# Patient Record
Sex: MALE | Race: WHITE | NOT HISPANIC OR LATINO | URBAN - METROPOLITAN AREA
[De-identification: names, ages, dates, MRNs, and addresses within clinical notes are randomized per-mention and may not be internally consistent; named-entity substitution may affect disease eponyms.]

---

## 2017-12-06 ENCOUNTER — IMPORTED ENCOUNTER (OUTPATIENT)
Dept: URBAN - METROPOLITAN AREA CLINIC 50 | Facility: CLINIC | Age: 77
End: 2017-12-06

## 2017-12-27 ENCOUNTER — IMPORTED ENCOUNTER (OUTPATIENT)
Dept: URBAN - METROPOLITAN AREA CLINIC 50 | Facility: CLINIC | Age: 77
End: 2017-12-27

## 2018-12-26 ENCOUNTER — IMPORTED ENCOUNTER (OUTPATIENT)
Dept: URBAN - METROPOLITAN AREA CLINIC 50 | Facility: CLINIC | Age: 78
End: 2018-12-26

## 2019-02-06 NOTE — PATIENT DISCUSSION
NO FAMILY HISTORY, DISCUSSED  VF 24-2. OCT RNFL.  OFFERED GTTS VS SLT, PT WISHES TO PROCEED WITH SLT OD

## 2020-02-25 NOTE — PATIENT DISCUSSION
POAG OD / GLAUCOMA SUSPECT, OS : S/P SLT OD INCREASED CUP/DISC RATIO. NO FAMILY HISTORY. VF CHANGES DO NOT CORRELATE WITH RNFL. REVIEWED WITH PT TODAY. GOAL IOP &lt;21.

## 2020-02-25 NOTE — PATIENT DISCUSSION
POAG, OD:  I have explained to the patient at length regarding the diagnosis of primary open angle glaucoma and its pathophysiology. I have discussed the various treatment options including medications and surgery. .  I emphasized to the patient the importance of compliance with treatment and follow-up appointments.

## 2021-02-24 NOTE — PATIENT DISCUSSION
POAG OD / GLAUCOMA SUSPECT, OS : S/P SLT OD INCREASED CUP/DISC RATIO. NO FAMILY HISTORY. REVIEWED VF AND RNFL WITH PT TODAY. IOP WNL. GOAL IOP &lt;21.

## 2021-04-18 ASSESSMENT — TONOMETRY
OD_IOP_MMHG: 18
OS_IOP_MMHG: 18
OD_IOP_MMHG: 18
OS_IOP_MMHG: 19

## 2021-04-18 ASSESSMENT — VISUAL ACUITY
OS_CC: J1
OD_PH: 20/30
OD_CC: J1+ -2
OD_OTHER: 20/70. 20/400.
OD_BAT: 20/70
OS_OTHER: 20/50. 20/70.
OS_BAT: 20/50
OS_BAT: 20/50
OS_CC: J1+ -2
OD_BAT: 20/70
OS_CC: 20/40
OS_OTHER: 20/50. 20/70.
OD_CC: J1
OD_CC: 20/30+2
OD_OTHER: 20/70. 20/400.
OS_CC: 20/30
OD_CC: 20/40

## 2022-09-16 NOTE — PATIENT DISCUSSION
POAG OD / GLAUCOMA SUSPECT, OS : S/P SLT OD INCREASED CUP/DISC RATIO. NO FAMILY HISTORY. REVIEWED VF AND RNFL WITH PT TODAY. IOP WNL. GOAL IOP<21.